# Patient Record
Sex: FEMALE | Race: WHITE
[De-identification: names, ages, dates, MRNs, and addresses within clinical notes are randomized per-mention and may not be internally consistent; named-entity substitution may affect disease eponyms.]

---

## 2020-10-07 ENCOUNTER — HOSPITAL ENCOUNTER (EMERGENCY)
Dept: HOSPITAL 97 - ER | Age: 60
Discharge: HOME | End: 2020-10-07
Payer: COMMERCIAL

## 2020-10-07 VITALS — DIASTOLIC BLOOD PRESSURE: 74 MMHG | TEMPERATURE: 98.6 F | SYSTOLIC BLOOD PRESSURE: 153 MMHG | OXYGEN SATURATION: 100 %
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
## 2020-10-07 DIAGNOSIS — E78.5: ICD-10-CM
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
## 2020-10-07 DIAGNOSIS — S01.01XA: Primary | ICD-10-CM

## 2020-10-07 DIAGNOSIS — I10: ICD-10-CM

## 2020-10-07 PROCEDURE — 99283 EMERGENCY DEPT VISIT LOW MDM: CPT

## 2020-10-07 PROCEDURE — 0JQ00ZZ REPAIR SCALP SUBCUTANEOUS TISSUE AND FASCIA, OPEN APPROACH: ICD-10-PCS

## 2020-10-07 NOTE — ER
Nurse's Notes                                                                                     

 [facility]                                                                 

Name: Cassandra Ch                                                                                

Age: 60 yrs                                                                                       

Sex: Female                                                                                       

: 1960                                                                                   

MRN: [id number]                                                                                   

Arrival Date: 10/07/2020                                                                          

Time: 09:35                                                                                       

Account#: [id number]                                                                            

Bed 24                                                                                            

Private MD:                                                                                       

Diagnosis: Laceration without foreign body of scalp                                               

                                                                                                  

Presentation:                                                                                     

10/07                                                                                             

09:54 Chief complaint: Patient states: was cleaning floors an slipped, fell back and hit back iw  

      of head on floor, abrasion to back of head, was worried it might need stitches, denies      

      LOC. Coronavirus screen: At this time, the client does not indicate any symptoms            

      associated with coronavirus-19. Ebola Screen: Patient negative for fever greater than       

      or equal to 101.5 degrees Fahrenheit, and additional compatible Ebola Virus Disease         

      symptoms Patient denies exposure to infectious person. Patient denies travel to an          

      Ebola-affected area in the 21 days before illness onset. No symptoms or risks               

      identified at this time. Complicating Factors: There are no complicating factors for        

      this patient. Initial Sepsis Screen: Does the patient meet any 2 criteria? No.              

      Patient's initial sepsis screen is negative. Does the patient have a suspected source       

      of infection? No. Patient's initial sepsis screen is negative. Risk Assessment: Do you      

      want to hurt yourself or someone else? Patient reports no desire to harm self or            

      others. Onset of symptoms was 2020.                                             

09:54 Method Of Arrival: Ambulatory                                                           iw  

09:54 Acuity: DIEGO 4                                                                           iw  

                                                                                                  

Historical:                                                                                       

- Allergies:                                                                                      

09:56 No Known Allergies;                                                                     iw  

- Home Meds:                                                                                      

09:56 atenolol Oral [Active]; sertraline oral oral [Active]; amlodipine oral [Active];        iw  

      Simvastatin Oral [Active];                                                                  

- PMHx:                                                                                           

09:56 Hyperlipidemia; Hypertension;                                                           iw  

- PSHx:                                                                                           

09:56 None;                                                                                   iw  

                                                                                                  

- Immunization history:: Adult Immunizations up to date.                                          

- Social history:: Smoking status: .                                                              

                                                                                                  

                                                                                                  

Screening:                                                                                        

10:07 Abuse screen: Denies threats or abuse. Denies injuries from another. Nutritional        iw  

      screening: No deficits noted. Tuberculosis screening: No symptoms or risk factors           

      identified. Fall Risk None identified.                                                      

                                                                                                  

Assessment:                                                                                       

10:06 General: Appears in no apparent distress. Behavior is calm, cooperative. Pain:          iw  

      Complains of pain in back of head. Neuro: Level of Consciousness is awake, alert, obeys     

      commands, Oriented to person, place, time, situation, Denies dizziness, headache.           

      Cardiovascular: Patient's skin is warm and dry. Musculoskeletal: Range of motion:           

      intact in all extremities. Injury Description: Laceration sustained to scalp is             

      superficial, 0.5 to 2.5 cm long.                                                            

                                                                                                  

Vital Signs:                                                                                      

09:56  / 74; Pulse 75; Resp 16; Temp 98.6; Pulse Ox 100% on R/A; Weight 77.11 kg;       iw  

      Height 5 ft. 9 in. (175.26 cm); Pain 4/10;                                                  

09:56 Body Mass Index 25.10 (77.11 kg, 175.26 cm)                                             iw  

                                                                                                  

ED Course:                                                                                        

09:35 Patient arrived in ED.                                                                  as  

09:55 Triage completed.                                                                       iw  

09:56 Sadie Angela, RN is Primary Nurse.                                                   iw  

10:04 Gene Celestin PA is PHCP.                                                               jr8 

10:04 Irineo Benitez MD is Attending Physician.                                                jr8 

10:06 Arm band placed on.                                                                     iw  

10:06 Patient has correct armband on for positive identification.                             iw  

10:07 Assist provider with laceration repair on back of head that was 2.5 cm. or less using   iw  

      staples. Set up tray. Performed by Gene GARCIA Patient tolerated well.                   

10:07 Patient did not have IV access during this emergency room visit.                        iw  

                                                                                                  

Administered Medications:                                                                         

No medications were administered                                                                  

                                                                                                  

                                                                                                  

Outcome:                                                                                          

10:15 Discharge ordered by MD.                                                                [id number] 

10:22 Discharged to home ambulatory.                                                          iw  

10:22 Condition: good                                                                             

10:22 Discharge instructions given to patient, Instructed on discharge instructions, follow       

      up and referral plans. wound care, Demonstrated understanding of instructions,              

      follow-up care, wound care.                                                                 

10:23 Patient left the ED.                                                                    iw  

                                                                                                  

Signatures:                                                                                       

Flora Gerardo                             as                                                   

Sadie Angela RN                     RN   iw                                                   

Gene Celestin PA PA   jr8                                                  

                                                                                                  

Corrections: (The following items were deleted from the chart)                                    

10:22 10:07 No provider procedures requiring assistance completed. iw                         iw  

                                                                                                  

**************************************************************************************************

## 2020-10-07 NOTE — EDPHYS
Physician Documentation                                                                           

 [facility]                                                                 

Name: Cassandra Ch                                                                                

Age: 60 yrs                                                                                       

Sex: Female                                                                                       

: 1960                                                                                   

MRN: [id number]                                                                                   

Arrival Date: 10/07/2020                                                                          

Time: 09:35                                                                                       

Account#: [id number]                                                                            

Bed 24                                                                                            

Private MD:                                                                                       

ED Physician Irineo Benitez                                                                         

HPI:                                                                                              

10/07                                                                                             

10:11 This 60 yrs old  Female presents to ER via Ambulatory with complaints of       jr8 

      Laceration To Head.                                                                         

10:11 The patient has a laceration related to: fall injury occurred at home. The              jr8 

      laceration(s) is(are) located on the back of head. Onset: The symptoms/episode              

      began/occurred acutely, today. Associated signs and symptoms: The patient has no            

      apparent associated signs or symptoms. The patient has not experienced similar symptoms     

      in the past. The patient has not recently seen a physician. Patient stated that she         

      tripped and hit back of head on door frame. Denies LOC .                                    

                                                                                                  

Historical:                                                                                       

- Allergies:                                                                                      

09:56 No Known Allergies;                                                                     iw  

- Home Meds:                                                                                      

09:56 atenolol Oral [Active]; sertraline oral oral [Active]; amlodipine oral [Active];        iw  

      Simvastatin Oral [Active];                                                                  

- PMHx:                                                                                           

09:56 Hyperlipidemia; Hypertension;                                                           iw  

- PSHx:                                                                                           

09:56 None;                                                                                   iw  

                                                                                                  

- Immunization history:: Adult Immunizations up to date.                                          

- Social history:: Smoking status: .                                                              

                                                                                                  

                                                                                                  

ROS:                                                                                              

10:11 Eyes: Negative for injury, pain, redness, and discharge, ENT: Negative for injury,      jr8 

      pain, and discharge, Neck: Negative for injury, pain, and swelling, Cardiovascular:         

      Negative for chest pain, palpitations, and edema, Respiratory: Negative for shortness       

      of breath, cough, wheezing, and pleuritic chest pain, Abdomen/GI: Negative for              

      abdominal pain, nausea, vomiting, diarrhea, and constipation, Back: Negative for injury     

      and pain, MS/Extremity: Negative for injury and deformity, Neuro: Negative for              

      headache, weakness, numbness, tingling, and seizure.                                        

10:11 Skin: Positive for laceration(s).                                                           

                                                                                                  

Exam:                                                                                             

10:11 Eyes:  Pupils equal round and reactive to light, extra-ocular motions intact.  Lids and jr8 

      lashes normal.  Conjunctiva and sclera are non-icteric and not injected.  Cornea within     

      normal limits.  Periorbital areas with no swelling, redness, or edema. ENT:  Nares          

      patent. No nasal discharge, no septal abnormalities noted.  Tympanic membranes are          

      normal and external auditory canals are clear.  Oropharynx with no redness, swelling,       

      or masses, exudates, or evidence of obstruction, uvula midline.  Mucous membranes           

      moist. Neck:  Trachea midline, no thyromegaly or masses palpated, and no cervical           

      lymphadenopathy.  Supple, full range of motion without nuchal rigidity, or vertebral        

      point tenderness.  No Meningismus. Cardiovascular:  Regular rate and rhythm with a          

      normal S1 and S2.  No gallops, murmurs, or rubs.  Normal PMI, no JVD.  No pulse             

      deficits. Respiratory:  Lungs have equal breath sounds bilaterally, clear to                

      auscultation and percussion.  No rales, rhonchi or wheezes noted.  No increased work of     

      breathing, no retractions or nasal flaring. Abdomen/GI:  Soft, non-tender, with normal      

      bowel sounds.  No distension or tympany.  No guarding or rebound.  No evidence of           

      tenderness throughout. Back:  No spinal tenderness.  No costovertebral tenderness.          

      Full range of motion. MS/ Extremity:  Pulses equal, no cyanosis.  Neurovascular intact.     

       Full, normal range of motion. Neuro:  Awake and alert, GCS 15, oriented to person,         

      place, time, and situation.  Cranial nerves II-XII grossly intact.  Motor strength 5/5      

      in all extremities.  Sensory grossly intact.  Cerebellar exam normal.  Normal gait.         

10:11 Head/face: Noted is a laceration(s), that is deep, that is linear, 2 cm(s), of the          

      back of head.                                                                               

                                                                                                  

Vital Signs:                                                                                      

09:56  / 74; Pulse 75; Resp 16; Temp 98.6; Pulse Ox 100% on R/A; Weight 77.11 kg;       iw  

      Height 5 ft. 9 in. (175.26 cm); Pain 4/10;                                                  

09:56 Body Mass Index 25.10 (77.11 kg, 175.26 cm)                                             iw  

                                                                                                  

Laceration:                                                                                       

10:11 Wound Repair of 2cm ( 0.8in ) subcutaneous laceration to back of head. Linear shaped..  jr8 

      Minimal bleeding noted.. Distal neuro/vascular/tendon intact. Wound prep: Moderate          

      cleansing with hibiclenz by nurse, Wound irrigation with saline by nurse. Skin closed       

      with 2 staples Staples using staple gun. Patient tolerated well.                            

                                                                                                  

MDM:                                                                                              

10:04 Patient medically screened.                                                             jr8 

10:11 Data reviewed: vital signs, nurses notes, and as a result, I will discharge patient.    jr8 

      Data interpreted: Pulse oximetry: on room air is 100 %. Interpretation: normal.             

      Counseling: I had a detailed discussion with the patient and/or guardian regarding: the     

      historical points, exam findings, and any diagnostic results supporting the                 

      discharge/admit diagnosis, the need for outpatient follow up, a family practitioner, to     

      return to the emergency department if symptoms worsen or persist or if there are any        

      questions or concerns that arise at home. ED course: Per patient up to date on Tetanus.     

                                                                                                  

Administered Medications:                                                                         

No medications were administered                                                                  

                                                                                                  

                                                                                                  

Disposition:                                                                                      

13:16 Co-signature as Attending Physician, Irineo Benitez MD.                                    rn  

                                                                                                  

Disposition:                                                                                      

10/07/20 10:15 Discharged to Home. Impression: Laceration without foreign body of scalp.          

- Condition is Stable.                                                                            

- Discharge Instructions: Laceration Care, Adult, Stitches, Staples, or Adhesive Wound            

  Closure.                                                                                        

                                                                                                  

- Medication Reconciliation Form, Thank You Letter, Antibiotic Education, Prescription            

  Opioid Use form.                                                                                

- Follow up: Private Physician; When: 1 week; Reason: Wound Recheck, Recheck today's              

  complaints, Continuance of care, Staple/Suture removal, Re-evaluation by your                   

  physician.                                                                                      

- Problem is new.                                                                                 

- Symptoms have improved.                                                                         

                                                                                                  

                                                                                                  

                                                                                                  

Signatures:                                                                                       

Sadie Angela RN RN iw Nieto, Roman, MD MD rn Roszak, Josh, PA PA   jr8                                                  

                                                                                                  

Corrections: (The following items were deleted from the chart)                                    

10:23 10:15 10/07/2020 10:15 Discharged to Home. Impression: Laceration without foreign body  iw  

      of scalp. Condition is Stable. Forms are Medication Reconciliation Form, Thank You          

      Letter, Antibiotic Education, Prescription Opioid Use. Follow up: Private Physician;        

      When: 1 week; Reason: Wound Recheck, Recheck today's complaints, Continuance of care,       

      Staple/Suture removal, Re-evaluation by your physician. Problem is new. Symptoms have       

      improved. jr8                                                                               

                                                                                                  

**************************************************************************************************

## 2020-10-14 ENCOUNTER — HOSPITAL ENCOUNTER (EMERGENCY)
Dept: HOSPITAL 97 - ER | Age: 60
Discharge: HOME | End: 2020-10-14
Payer: COMMERCIAL

## 2020-10-14 VITALS — DIASTOLIC BLOOD PRESSURE: 81 MMHG | OXYGEN SATURATION: 100 % | TEMPERATURE: 97.4 F | SYSTOLIC BLOOD PRESSURE: 148 MMHG

## 2020-10-14 DIAGNOSIS — Z48.02: Primary | ICD-10-CM

## 2020-10-14 PROCEDURE — 99281 EMR DPT VST MAYX REQ PHY/QHP: CPT

## 2020-10-14 NOTE — ER
Nurse's Notes                                                                                     

 [facility]                                                                 

Name: Cassandra Ch                                                                                

Age: 60 yrs                                                                                       

Sex: Female                                                                                       

: 1960                                                                                   

MRN: [id number]                                                                                   

Arrival Date: 10/14/2020                                                                          

Time: 09:25                                                                                       

Account#: [id number]                                                                            

Bed 14                                                                                            

Private MD:                                                                                       

Diagnosis: Scalp staple removal                                                                   

                                                                                                  

Presentation:                                                                                     

10/14                                                                                             

09:37 Chief complaint: Two staples to back of head 1 week ago, here for removal. Coronavirus  hb  

      screen: At this time, the client does not indicate any symptoms associated with             

      coronavirus-19. Ebola Screen: No symptoms or risks identified at this time. Initial         

      Sepsis Screen: Does the patient meet any 2 criteria? No. Patient's initial sepsis           

      screen is negative. Does the patient have a suspected source of infection? No.              

      Patient's initial sepsis screen is negative. Risk Assessment: Do you want to hurt           

      yourself or someone else? Patient reports no desire to harm self or others. Onset of        

      symptoms was 2020.                                                              

09:37 Method Of Arrival: Ambulatory                                                           hb  

09:37 Acuity: DIEGO 4                                                                           hb  

                                                                                                  

Historical:                                                                                       

- Allergies:                                                                                      

09:39 No Known Allergies;                                                                     hb  

- Home Meds:                                                                                      

09:39 amlodipine oral [Active]; Atenolol Oral [Active]; sertraline Oral [Active]; Simvastatin hb  

      Oral [Active];                                                                              

- PMHx:                                                                                           

09:39 Hyperlipidemia; Hypertension;                                                           hb  

- PSHx:                                                                                           

09:39 None;                                                                                   hb  

                                                                                                  

- Immunization history:: Adult Immunizations up to date.                                          

- Social history:: Smoking status: Patient denies any tobacco usage or history of.                

                                                                                                  

                                                                                                  

Screenin:40 Abuse screen: Denies threats or abuse. Denies injuries from another. Nutritional        hb  

      screening: No deficits noted. Tuberculosis screening: No symptoms or risk factors           

      identified. Fall Risk None identified.                                                      

                                                                                                  

Assessment:                                                                                       

10:00 General: Appears in no apparent distress. comfortable, Behavior is calm, cooperative,   em  

      appropriate for age. Pain: Denies pain. Neuro: Level of Consciousness is awake, alert,      

      obeys commands, Oriented to person, place, time, situation, Appropriate for age.            

      Cardiovascular: Capillary refill < 3 seconds Patient's skin is warm and dry.                

      Respiratory: Airway is patent Respiratory effort is even, unlabored, Respiratory            

      pattern is regular, symmetrical. Derm: Skin is intact, is healthy with good turgor,         

      Skin is pink, warm \T\ dry. Musculoskeletal: Capillary refill < 3 seconds, Range of         

      motion: intact in all extremities.                                                          

                                                                                                  

Vital Signs:                                                                                      

09:37  / 81; Pulse 82; Resp 16; Temp 97.4; Pulse Ox 100% ; Pain 0/10;                   hb  

                                                                                                  

ED Course:                                                                                        

09:25 Patient arrived in ED.                                                                  ds1 

09:33 Clifford Paulino MD is Attending Physician.                                              kdr 

09:38 Triage completed.                                                                       hb  

09:39 Arm band placed on.                                                                     hb  

09:40 Patient has correct armband on for positive identification. Bed in low position. Call   hb  

      light in reach.                                                                             

10:10 Suleman Abraham, RN is Primary Nurse.                                                      em  

10:11 No provider procedures requiring assistance completed. Patient did not have IV access   em  

      during this emergency room visit.                                                           

                                                                                                  

Administered Medications:                                                                         

No medications were administered                                                                  

                                                                                                  

                                                                                                  

Outcome:                                                                                          

:50 Discharge ordered by MD.                                                                kdr 

10:11 Discharged to home ambulatory.                                                          em  

10:11 Condition: good                                                                             

10:11 Discharge instructions given to patient, Instructed on discharge instructions, follow       

      up and referral plans. Demonstrated understanding of instructions, follow-up care.          

10:11 Patient left the ED.                                                                    em  

                                                                                                  

Signatures:                                                                                       

Clifford Paulino MD MD   kdr                                                  

Suleman Abraham, RN                        RN   em                                                   

Priya Cunningham                                ds1                                                  

Alexandria Toledo RN                     RN   hb                                                   

                                                                                                  

**************************************************************************************************

## 2020-10-14 NOTE — EDPHYS
Physician Documentation                                                                           

 [facility]                                                                 

Name: Cassandra Ch                                                                                

Age: 60 yrs                                                                                       

Sex: Female                                                                                       

: 1960                                                                                   

MRN: [id number]                                                                                   

Arrival Date: 10/14/2020                                                                          

Time: 09:25                                                                                       

Account#: [id number]                                                                            

Bed 14                                                                                            

Private MD:                                                                                       

ED Physician Clifford Paulino                                                                       

HPI:                                                                                              

10/14                                                                                             

11:33 This 60 yrs old  Female presents to ER via Ambulatory with complaints of       kdr 

      Staple Removal.                                                                             

11:33 The patient has staples on the left parietal area. Previous treatment: The patient was  kdr 

      initially treated 7 day(s) ago, the care was rendered at [facility], Treatment type: The patient's original treatment included staples, Outpatient       

      prescription(s): The patient was given prescription(s) for nothing, Previous recheck:       

      the patient has not been checked since the original treatment. Sutures/staples              

      progress: The patient has no c/o's. The wound is well-healing with no redness,              

      swelling, discharge, or dehiscence reported. The patient has not experienced similar        

      symptoms in the past. The patient has been recently seen by a physician: The patient        

      has been recently seen at the [facility] Emergency Department,       

      last week.                                                                                  

                                                                                                  

Historical:                                                                                       

- Allergies:                                                                                      

09:39 No Known Allergies;                                                                     hb  

- Home Meds:                                                                                      

09:39 amlodipine oral [Active]; Atenolol Oral [Active]; sertraline Oral [Active]; Simvastatin hb  

      Oral [Active];                                                                              

- PMHx:                                                                                           

09:39 Hyperlipidemia; Hypertension;                                                           hb  

- PSHx:                                                                                           

09:39 None;                                                                                   hb  

                                                                                                  

- Immunization history:: Adult Immunizations up to date.                                          

- Social history:: Smoking status: Patient denies any tobacco usage or history of.                

                                                                                                  

                                                                                                  

ROS:                                                                                              

11:33 Constitutional: Negative for fever, chills, and weight loss, Eyes: Negative for injury, kdr 

      pain, redness, and discharge, Neck: Negative for injury, pain, and swelling, Skin:          

      Negative for injury, rash, and discoloration.                                               

                                                                                                  

Exam:                                                                                             

11:33 Constitutional:  This is a well developed, well nourished patient who is awake, alert,  kdr 

      and in no acute distress.                                                                   

11:33 Head/face: Noted is two staple in the left posterior occiput that are healing well.         

                                                                                                  

Vital Signs:                                                                                      

09:37  / 81; Pulse 82; Resp 16; Temp 97.4; Pulse Ox 100% ; Pain 0/10;                   hb  

                                                                                                  

Procedures:                                                                                       

11:33 Suture/Staple removal: Removed 2 staples, from left parietal area, site appears well    kdr 

      healed, dressed with Patient tolerated well.                                                

                                                                                                  

MDM:                                                                                              

09:50 Patient medically screened.                                                             kdr 

11:33 Data reviewed: vital signs, nurses notes. Counseling: I had a detailed discussion with  kdr 

      the patient and/or guardian regarding: the historical points, exam findings, and any        

      diagnostic results supporting the discharge/admit diagnosis, the need for outpatient        

      follow up.                                                                                  

                                                                                                  

Administered Medications:                                                                         

No medications were administered                                                                  

                                                                                                  

                                                                                                  

Disposition:                                                                                      

10/14/20 09:50 Discharged to Home. Impression: Scalp staple removal.                              

- Condition is Stable.                                                                            

- Discharge Instructions: Suture Removal, Care After, Stitches, Staples, or Adhesive              

  Wound Closure, Easy-to-Read.                                                                    

                                                                                                  

- Medication Reconciliation Form, Thank You Letter form.                                          

- Follow up: Private Physician; When: 2 - 3 days; Reason: If symptoms return, Further             

  diagnostic work-up, Recheck today's complaints, Continuance of care, Re-evaluation by           

  your physician.                                                                                 

- Problem is new.                                                                                 

- Symptoms are resolved.                                                                          

                                                                                                  

                                                                                                  

                                                                                                  

Signatures:                                                                                       

Clifford Paulino MD MD   [facility]                                                  

Suleman Abraham RN                        RN                                                      

Alexandria Toledo RN                     RN                                                      

                                                                                                  

Corrections: (The following items were deleted from the chart)                                    

10:11 09:50 10/14/2020 09:50 Discharged to Home. Impression: Scalp staple removal. Condition  em  

      is Stable. Forms are Medication Reconciliation Form, Thank You Letter, Antibiotic           

      Education, Prescription Opioid Use. Follow up: Private Physician; When: 2 - 3 days;         

      Reason: If symptoms return, Further diagnostic work-up, Recheck today's complaints,         

      Continuance of care, Re-evaluation by your physician. Problem is new. Symptoms are          

      resolved. kdr                                                                               

                                                                                                  

**************************************************************************************************